# Patient Record
Sex: MALE | Race: WHITE | NOT HISPANIC OR LATINO | Employment: OTHER | URBAN - METROPOLITAN AREA
[De-identification: names, ages, dates, MRNs, and addresses within clinical notes are randomized per-mention and may not be internally consistent; named-entity substitution may affect disease eponyms.]

---

## 2020-05-21 ENCOUNTER — HOSPITAL ENCOUNTER (EMERGENCY)
Facility: HOSPITAL | Age: 55
Discharge: HOME | End: 2020-05-21
Attending: EMERGENCY MEDICINE
Payer: MEDICAID

## 2020-05-21 VITALS
OXYGEN SATURATION: 97 % | TEMPERATURE: 99 F | BODY MASS INDEX: 23.06 KG/M2 | SYSTOLIC BLOOD PRESSURE: 142 MMHG | WEIGHT: 174 LBS | HEART RATE: 72 BPM | DIASTOLIC BLOOD PRESSURE: 87 MMHG | HEIGHT: 73 IN | RESPIRATION RATE: 18 BRPM

## 2020-05-21 DIAGNOSIS — R06.6 HICCUPS: ICD-10-CM

## 2020-05-21 LAB
ALBUMIN SERPL BCP-MCNC: 4.4 G/DL (ref 3.5–5.2)
ALP SERPL-CCNC: 100 U/L (ref 55–135)
ALT SERPL W/O P-5'-P-CCNC: 15 U/L (ref 10–44)
ANION GAP SERPL CALC-SCNC: 11 MMOL/L (ref 8–16)
AST SERPL-CCNC: 25 U/L (ref 10–40)
BASOPHILS # BLD AUTO: 0.05 K/UL (ref 0–0.2)
BASOPHILS NFR BLD: 0.5 % (ref 0–1.9)
BILIRUB SERPL-MCNC: 0.6 MG/DL (ref 0.1–1)
BUN SERPL-MCNC: 16 MG/DL (ref 6–20)
CALCIUM SERPL-MCNC: 10.2 MG/DL (ref 8.7–10.5)
CHLORIDE SERPL-SCNC: 103 MMOL/L (ref 95–110)
CO2 SERPL-SCNC: 27 MMOL/L (ref 23–29)
CREAT SERPL-MCNC: 0.9 MG/DL (ref 0.5–1.4)
DIFFERENTIAL METHOD: ABNORMAL
EOSINOPHIL # BLD AUTO: 0.1 K/UL (ref 0–0.5)
EOSINOPHIL NFR BLD: 1.3 % (ref 0–8)
ERYTHROCYTE [DISTWIDTH] IN BLOOD BY AUTOMATED COUNT: 11.9 % (ref 11.5–14.5)
EST. GFR  (AFRICAN AMERICAN): >60 ML/MIN/1.73 M^2
EST. GFR  (NON AFRICAN AMERICAN): >60 ML/MIN/1.73 M^2
GLUCOSE SERPL-MCNC: 223 MG/DL (ref 70–110)
HCT VFR BLD AUTO: 45.6 % (ref 40–54)
HGB BLD-MCNC: 15.9 G/DL (ref 14–18)
IMM GRANULOCYTES # BLD AUTO: 0.03 K/UL (ref 0–0.04)
IMM GRANULOCYTES NFR BLD AUTO: 0.3 % (ref 0–0.5)
LIPASE SERPL-CCNC: 8 U/L (ref 4–60)
LYMPHOCYTES # BLD AUTO: 2.4 K/UL (ref 1–4.8)
LYMPHOCYTES NFR BLD: 25.8 % (ref 18–48)
MCH RBC QN AUTO: 31.9 PG (ref 27–31)
MCHC RBC AUTO-ENTMCNC: 34.9 G/DL (ref 32–36)
MCV RBC AUTO: 92 FL (ref 82–98)
MONOCYTES # BLD AUTO: 0.5 K/UL (ref 0.3–1)
MONOCYTES NFR BLD: 5.4 % (ref 4–15)
NEUTROPHILS # BLD AUTO: 6.1 K/UL (ref 1.8–7.7)
NEUTROPHILS NFR BLD: 66.7 % (ref 38–73)
NRBC BLD-RTO: 0 /100 WBC
PLATELET # BLD AUTO: 200 K/UL (ref 150–350)
PMV BLD AUTO: 10.2 FL (ref 9.2–12.9)
POCT GLUCOSE: 173 MG/DL (ref 70–110)
POTASSIUM SERPL-SCNC: 4.5 MMOL/L (ref 3.5–5.1)
PROT SERPL-MCNC: 7.6 G/DL (ref 6–8.4)
RBC # BLD AUTO: 4.98 M/UL (ref 4.6–6.2)
SODIUM SERPL-SCNC: 141 MMOL/L (ref 136–145)
TROPONIN I SERPL DL<=0.01 NG/ML-MCNC: <0.006 NG/ML (ref 0–0.03)
WBC # BLD AUTO: 9.11 K/UL (ref 3.9–12.7)

## 2020-05-21 PROCEDURE — 82962 GLUCOSE BLOOD TEST: CPT

## 2020-05-21 PROCEDURE — 63600175 PHARM REV CODE 636 W HCPCS: Performed by: PHYSICIAN ASSISTANT

## 2020-05-21 PROCEDURE — C9113 INJ PANTOPRAZOLE SODIUM, VIA: HCPCS | Performed by: PHYSICIAN ASSISTANT

## 2020-05-21 PROCEDURE — 85025 COMPLETE CBC W/AUTO DIFF WBC: CPT

## 2020-05-21 PROCEDURE — 25000003 PHARM REV CODE 250: Performed by: PHYSICIAN ASSISTANT

## 2020-05-21 PROCEDURE — 96361 HYDRATE IV INFUSION ADD-ON: CPT

## 2020-05-21 PROCEDURE — 93010 ELECTROCARDIOGRAM REPORT: CPT | Mod: ,,, | Performed by: INTERNAL MEDICINE

## 2020-05-21 PROCEDURE — 80053 COMPREHEN METABOLIC PANEL: CPT

## 2020-05-21 PROCEDURE — 96375 TX/PRO/DX INJ NEW DRUG ADDON: CPT

## 2020-05-21 PROCEDURE — 83690 ASSAY OF LIPASE: CPT

## 2020-05-21 PROCEDURE — 99285 EMERGENCY DEPT VISIT HI MDM: CPT | Mod: 25

## 2020-05-21 PROCEDURE — 93005 ELECTROCARDIOGRAM TRACING: CPT

## 2020-05-21 PROCEDURE — 93010 EKG 12-LEAD: ICD-10-PCS | Mod: ,,, | Performed by: INTERNAL MEDICINE

## 2020-05-21 PROCEDURE — 96372 THER/PROPH/DIAG INJ SC/IM: CPT | Mod: 59

## 2020-05-21 PROCEDURE — 84484 ASSAY OF TROPONIN QUANT: CPT

## 2020-05-21 PROCEDURE — 96374 THER/PROPH/DIAG INJ IV PUSH: CPT

## 2020-05-21 RX ORDER — PANTOPRAZOLE SODIUM 20 MG/1
20 TABLET, DELAYED RELEASE ORAL DAILY
Qty: 30 TABLET | Refills: 0 | Status: SHIPPED | OUTPATIENT
Start: 2020-05-21 | End: 2020-06-20

## 2020-05-21 RX ORDER — PANTOPRAZOLE SODIUM 40 MG/10ML
40 INJECTION, POWDER, LYOPHILIZED, FOR SOLUTION INTRAVENOUS
Status: COMPLETED | OUTPATIENT
Start: 2020-05-21 | End: 2020-05-21

## 2020-05-21 RX ORDER — ONDANSETRON 2 MG/ML
4 INJECTION INTRAMUSCULAR; INTRAVENOUS
Status: COMPLETED | OUTPATIENT
Start: 2020-05-21 | End: 2020-05-21

## 2020-05-21 RX ORDER — CHLORPROMAZINE HYDROCHLORIDE 25 MG/ML
50 INJECTION INTRAMUSCULAR
Status: COMPLETED | OUTPATIENT
Start: 2020-05-21 | End: 2020-05-21

## 2020-05-21 RX ORDER — ONDANSETRON 4 MG/1
4 TABLET, ORALLY DISINTEGRATING ORAL EVERY 6 HOURS PRN
Qty: 15 TABLET | Refills: 0 | Status: SHIPPED | OUTPATIENT
Start: 2020-05-21 | End: 2020-05-26

## 2020-05-21 RX ADMIN — ONDANSETRON HYDROCHLORIDE 4 MG: 2 SOLUTION INTRAMUSCULAR; INTRAVENOUS at 06:05

## 2020-05-21 RX ADMIN — CHLORPROMAZINE HYDROCHLORIDE 50 MG: 25 INJECTION INTRAMUSCULAR at 07:05

## 2020-05-21 RX ADMIN — SODIUM CHLORIDE 1000 ML: 0.9 INJECTION, SOLUTION INTRAVENOUS at 06:05

## 2020-05-21 RX ADMIN — PANTOPRAZOLE SODIUM 40 MG: 40 INJECTION, POWDER, LYOPHILIZED, FOR SOLUTION INTRAVENOUS at 06:05

## 2020-05-21 NOTE — PROVIDER PROGRESS NOTES - EMERGENCY DEPT.
Emergency Department TeleTRIAGE Encounter Note      CHIEF COMPLAINT    Chief Complaint   Patient presents with    Hiccups     pt. reports he has been having hiccups since monday. pt. reports he has had this ssymptom in the past. pt. reports symptoms comes and goes.        VITAL SIGNS   Initial Vitals [05/21/20 1644]   BP Pulse Resp Temp SpO2   (!) 148/87 69 20 98.7 °F (37.1 °C) 97 %      MAP       --            ALLERGIES    Review of patient's allergies indicates:  No Known Allergies    PROVIDER TRIAGE NOTE  Patient with no significant past medical history presents to the ED for evaluation of pickups.  Patient states he has had intermittent hiccups for the past 3 days.  He reports acid reflux as well that is cause irritation in his throat.  He has had this symptom in the past.  He has tried taking Tums without relief.  He denies chest pain or shortness of breath.      ORDERS  Labs Reviewed - No data to display    ED Orders (720h ago, onward)    None            Virtual Visit Note: The provider triage portion of this emergency department evaluation and documentation was performed via [employer], a HIPAA-compliant telemedicine application, in concert with a tele-presenter in the room. A face to face patient evaluation with one of my colleagues will occur once the patient is placed in an emergency department room.      DISCLAIMER: This note was prepared with [employer] voice recognition transcription software. Garbled syntax, mangled pronouns, and other bizarre constructions may be attributed to that software system.

## 2020-05-21 NOTE — ED TRIAGE NOTES
Pt presents to the ED via personal transportation reporting hiccups since Monday and pt reporting they aren't going away. Pt reports having these symptoms in the past.

## 2020-05-22 NOTE — ED PROVIDER NOTES
Encounter Date: 5/21/2020       History     Chief Complaint   Patient presents with    Hiccups     pt. reports he has been having hiccups since monday. pt. reports he has had this ssymptom in the past. pt. reports symptoms comes and goes.      CC: Hiccups    HPI:   56 y/o male with history of DM presenting for evaluation of 2 day history of constant hiccups. Pt reports he had similar episode years ago lasting 3 days that resolved spontaneously. Pt has developed intermittent chest pain/discomfort since onset of hiccups. He has been unable to tolerate PO and states he has been vomiting after eating or drinking due to hiccups. He reports he has chronic nausea and vomiting that he attributes to GERD that is worse after drinking coffee in the mornings. He has never had EGD or colonoscopy. Denies SOB, dizziness, lightheadedness, HA, dizziness, lightheadedness, weakness, paresthesias, speech or gait difficulty, abdominal pain, diarrhea.         Review of patient's allergies indicates:  No Known Allergies  No past medical history on file.  No past surgical history on file.  No family history on file.  Social History     Tobacco Use    Smoking status: Not on file   Substance Use Topics    Alcohol use: Not on file    Drug use: Not on file     Review of Systems   Constitutional: Negative for chills and fever.   HENT: Negative for congestion, ear pain, rhinorrhea, sore throat and trouble swallowing.    Eyes: Negative for redness.   Respiratory: Negative for cough, shortness of breath and stridor.         (+) hiccups     Cardiovascular: Positive for chest pain.   Gastrointestinal: Negative for abdominal pain, nausea and vomiting.   Musculoskeletal: Negative for back pain and neck pain.   Skin: Negative for rash.   Neurological: Negative for dizziness, speech difficulty, weakness, light-headedness, numbness and headaches.   Psychiatric/Behavioral: Negative for confusion.       Physical Exam     Initial Vitals [05/21/20 1644]    BP Pulse Resp Temp SpO2   (!) 148/87 69 20 98.7 °F (37.1 °C) 97 %      MAP       --         Physical Exam    Nursing note and vitals reviewed.  Constitutional: He appears well-developed and well-nourished.   HENT:   Head: Normocephalic.   Right Ear: External ear normal.   Left Ear: External ear normal.   Eyes: Conjunctivae are normal.   Pulmonary/Chest: Effort normal.   Abdominal: Soft. He exhibits no distension. There is no tenderness. There is no rebound and no guarding.   Neurological: He is alert.   Skin: Skin is warm and dry.         ED Course   Procedures  Labs Reviewed   CBC W/ AUTO DIFFERENTIAL - Abnormal; Notable for the following components:       Result Value    Mean Corpuscular Hemoglobin 31.9 (*)     All other components within normal limits    Narrative:     Recoll. [id number] by [email or web address] at 05/21/2020 18:29, reason:   CLOTTED/DISCARDED/KALYANI   COMPREHENSIVE METABOLIC PANEL - Abnormal; Notable for the following components:    Glucose 223 (*)     All other components within normal limits   POCT GLUCOSE - Abnormal; Notable for the following components:    POCT Glucose 173 (*)     All other components within normal limits   LIPASE   TROPONIN I   TROPONIN I        ECG Results          EKG 12-lead (In process)  Result time 05/22/20 06:12:55    In process by Interface, Lab In [facility] (05/22/20 06:12:55)                 Narrative:    Test Reason : R06.6,    Vent. Rate : 057 BPM     Atrial Rate : 057 BPM     P-R Int : 146 ms          QRS Dur : 088 ms      QT Int : 406 ms       P-R-T Axes : 075 098 057 degrees     QTc Int : 395 ms    Sinus bradycardia with sinus arrhythmia  Rightward axis  ST elevation, consider early repolarization  Borderline Abnormal ECG  No previous ECGs available    Referred By: [email or web address]   SELF           Confirmed By:                   In process by Interface, Lab In [facility] (05/22/20 06:08:18)                 Narrative:    Test Reason : R06.6,    Vent. Rate : 057 BPM     Atrial Rate :  057 BPM     P-R Int : 146 ms          QRS Dur : 088 ms      QT Int : 406 ms       P-R-T Axes : 075 098 057 degrees     QTc Int : 395 ms    Sinus bradycardia with sinus arrhythmia  Rightward axis  ST elevation, consider early repolarization  Borderline Abnormal ECG  No previous ECGs available    Referred By: [email or web address]   SELF           Confirmed By:                             Imaging Results          X-Ray Chest AP Portable (Final result)  Result time 05/21/20 18:59:18    Final result by Everardo Pineda MD (05/21/20 18:59:18)                 Impression:      No radiographic acute intrathoracic process seen.    Electronically signed by resident: Bob Michelle  Date:    05/21/2020  Time:    18:56    Electronically signed by: Everardo Pineda MD  Date:    05/21/2020  Time:    18:59             Narrative:    EXAMINATION:  XR CHEST AP PORTABLE    CLINICAL HISTORY:  Hiccough    TECHNIQUE:  Portable AP view of the chest.    COMPARISON:  None    FINDINGS:  Trachea is midline.  The lungs are clear, with normal appearance of pulmonary vasculature and no pleural effusion or pneumothorax.    The cardiac silhouette is normal in size. The hilar and mediastinal contours are unremarkable.    Bones are intact.                                 Medical Decision Making:   Initial Assessment:   54 y/o male with history of DM presenting for evaluation of 2 day history of hiccups. Reports he has had chest discomfort and pain since onset. Pt is afebrile, nontoxic in no distress. Exam above. Cardiac workup is negative. Considered but doubt ACS as etiology of pts symptoms. No focal neuro deficits. Pt's symptoms fully resolved after IV NS, protonix and thorazine. Think likely 2/2 throat /stomach irritation, likely GERD. Will have him follow up with GI and return to ER for worsening symptoms or as needed. Discussed with Dr. Davis who agrees with assessment and plan.               Attending Attestation:     Physician Attestation Statement for NP/PA:    I discussed this assessment and plan of this patient with the NP/PA, but I did not personally examine the patient. The face to face encounter was performed by the NP/PA.    Other NP/PA Attestation Additions:      Medical Decision Making: Patient seems to be familiar with this problem.  Treated in the ER and will have him follow-up with PCP.                               Clinical Impression:       ICD-10-CM ICD-9-CM   1. Hiccups R06.6 786.8   2. Hiccups R06.6 786.8             ED Disposition Condition    Discharge Stable        ED Prescriptions     Medication Sig Dispense Start Date End Date Auth. Provider    pantoprazole (PROTONIX) 20 MG tablet Take 1 tablet (20 mg total) by mouth once daily. 30 tablet 5/21/2020 6/20/2020 Ebonie Benton PA-C    ondansetron (ZOFRAN-ODT) 4 MG TbDL Take 1 tablet (4 mg total) by mouth every 6 (six) hours as needed (for nausea). 15 tablet 5/21/2020 5/26/2020 Ebonie Benton PA-C        Follow-up Information     Follow up With Specialties Details Why Contact Info    [facility]  Schedule an appointment as soon as possible for a visit   [address]  [address]  [facility] [address]  [phone]      [facility]  Schedule an appointment as soon as possible for a visit   [address]  [place] [address]  [phone]      [facility]  Schedule an appointment as soon as possible for a visit in 2 days for follow up Please call [phone] to schedule follow up with [facility].    Dimitry Fonseca MD Gastroenterology Schedule an appointment as soon as possible for a visit in 2 days for follow up with [address]  [address]  [facility]  [facility] [address] [phone]      [facility] Emergency Medicine Go to  If symptoms worsen, As needed [address] [facility] [address] [phone]                                      Ebonie Benton PA-C  05/22/20 2008       Karie Davis MD  05/22/20 [id number]

## 2020-05-22 NOTE — DISCHARGE INSTRUCTIONS
Take Protonix and follow up with GI. You can take zofran for nausea. Read attached Follow up with primary care. Return to ER for worsening symptoms or as needed